# Patient Record
Sex: FEMALE | Race: WHITE
[De-identification: names, ages, dates, MRNs, and addresses within clinical notes are randomized per-mention and may not be internally consistent; named-entity substitution may affect disease eponyms.]

---

## 2020-09-10 ENCOUNTER — HOSPITAL ENCOUNTER (INPATIENT)
Dept: HOSPITAL 96 - M.ERS | Age: 85
LOS: 4 days | Discharge: HOME | DRG: 177 | End: 2020-09-14
Attending: INTERNAL MEDICINE | Admitting: INTERNAL MEDICINE
Payer: MEDICARE

## 2020-09-10 VITALS — BODY MASS INDEX: 28.68 KG/M2 | HEIGHT: 64.02 IN | WEIGHT: 168 LBS

## 2020-09-10 VITALS — SYSTOLIC BLOOD PRESSURE: 166 MMHG | DIASTOLIC BLOOD PRESSURE: 78 MMHG

## 2020-09-10 VITALS — DIASTOLIC BLOOD PRESSURE: 63 MMHG | SYSTOLIC BLOOD PRESSURE: 172 MMHG

## 2020-09-10 VITALS — SYSTOLIC BLOOD PRESSURE: 176 MMHG | DIASTOLIC BLOOD PRESSURE: 66 MMHG

## 2020-09-10 DIAGNOSIS — I50.9: ICD-10-CM

## 2020-09-10 DIAGNOSIS — Z88.5: ICD-10-CM

## 2020-09-10 DIAGNOSIS — J96.02: ICD-10-CM

## 2020-09-10 DIAGNOSIS — M19.90: ICD-10-CM

## 2020-09-10 DIAGNOSIS — G47.00: ICD-10-CM

## 2020-09-10 DIAGNOSIS — J44.0: ICD-10-CM

## 2020-09-10 DIAGNOSIS — J20.9: ICD-10-CM

## 2020-09-10 DIAGNOSIS — R54: ICD-10-CM

## 2020-09-10 DIAGNOSIS — E78.5: ICD-10-CM

## 2020-09-10 DIAGNOSIS — E66.9: ICD-10-CM

## 2020-09-10 DIAGNOSIS — Z90.49: ICD-10-CM

## 2020-09-10 DIAGNOSIS — J96.01: ICD-10-CM

## 2020-09-10 DIAGNOSIS — Z79.899: ICD-10-CM

## 2020-09-10 DIAGNOSIS — J44.1: ICD-10-CM

## 2020-09-10 DIAGNOSIS — Z88.8: ICD-10-CM

## 2020-09-10 DIAGNOSIS — F17.210: ICD-10-CM

## 2020-09-10 DIAGNOSIS — I11.0: ICD-10-CM

## 2020-09-10 DIAGNOSIS — Z20.828: ICD-10-CM

## 2020-09-10 DIAGNOSIS — J15.6: Primary | ICD-10-CM

## 2020-09-10 DIAGNOSIS — Z90.710: ICD-10-CM

## 2020-09-10 DIAGNOSIS — F32.9: ICD-10-CM

## 2020-09-10 DIAGNOSIS — M81.0: ICD-10-CM

## 2020-09-10 DIAGNOSIS — Z96.652: ICD-10-CM

## 2020-09-10 DIAGNOSIS — K21.9: ICD-10-CM

## 2020-09-10 DIAGNOSIS — Z88.6: ICD-10-CM

## 2020-09-10 LAB
ABSOLUTE BASOPHILS: 0 THOU/UL (ref 0–0.2)
ABSOLUTE EOSINOPHILS: 0.1 THOU/UL (ref 0–0.7)
ABSOLUTE MONOCYTES: 0.4 THOU/UL (ref 0–1.2)
ALBUMIN SERPL-MCNC: 3.5 G/DL (ref 3.4–5)
ALP SERPL-CCNC: 49 U/L (ref 46–116)
ALT SERPL-CCNC: 13 U/L (ref 30–65)
ANION GAP SERPL CALC-SCNC: 4 MMOL/L (ref 7–16)
AST SERPL-CCNC: 12 U/L (ref 15–37)
BACTERIA-REFLEX: (no result) /HPF
BASOPHILS NFR BLD AUTO: 0.4 %
BE: 2 MMOL/L
BILIRUB SERPL-MCNC: 0.3 MG/DL
BILIRUB UR-MCNC: NEGATIVE MG/DL
BUN SERPL-MCNC: 13 MG/DL (ref 7–18)
CALCIUM SERPL-MCNC: 8.4 MG/DL (ref 8.5–10.1)
CHLORIDE SERPL-SCNC: 104 MMOL/L (ref 98–107)
CO2 SERPL-SCNC: 34 MMOL/L (ref 21–32)
COLOR UR: YELLOW
CREAT SERPL-MCNC: 1.1 MG/DL (ref 0.6–1.3)
EOSINOPHIL NFR BLD: 1 %
GLUCOSE SERPL-MCNC: 100 MG/DL (ref 70–99)
GRANULOCYTES NFR BLD MANUAL: 63.8 %
HCT VFR BLD CALC: 36.8 % (ref 37–47)
HGB BLD-MCNC: 12.3 GM/DL (ref 12–15)
INR PPP: 1
KETONES UR STRIP-MCNC: NEGATIVE MG/DL
LYMPHOCYTES # BLD: 1.5 THOU/UL (ref 0.8–5.3)
LYMPHOCYTES NFR BLD AUTO: 27.6 %
MAGNESIUM SERPL-MCNC: 2.1 MG/DL (ref 1.8–2.4)
MCH RBC QN AUTO: 31.6 PG (ref 26–34)
MCHC RBC AUTO-ENTMCNC: 33.5 G/DL (ref 28–37)
MCV RBC: 94.3 FL (ref 80–100)
MONOCYTES NFR BLD: 7.2 %
MPV: 7.6 FL. (ref 7.2–11.1)
MUCUS: (no result) STRN/LPF
NEUTROPHILS # BLD: 3.4 THOU/UL (ref 1.6–8.1)
NUCLEATED RBCS: 0 /100WBC
PCO2 BLD: 53 MMHG (ref 35–45)
PLATELET COUNT*: 154 THOU/UL (ref 150–400)
PO2 BLD: 61.9 MMHG (ref 75–100)
POTASSIUM SERPL-SCNC: 4.2 MMOL/L (ref 3.5–5.1)
PROT SERPL-MCNC: 6.1 G/DL (ref 6.4–8.2)
PROT UR QL STRIP: NEGATIVE
PROTHROMBIN TIME: 10.3 SECONDS (ref 9.2–11.5)
RBC # BLD AUTO: 3.9 MIL/UL (ref 4.2–5)
RBC # UR STRIP: NEGATIVE /UL
RBC #/AREA URNS HPF: (no result) /HPF (ref 0–2)
RDW-CV: 14.6 % (ref 10.5–14.5)
SODIUM SERPL-SCNC: 142 MMOL/L (ref 136–145)
SP GR UR STRIP: 1.01 (ref 1–1.03)
SQUAMOUS: (no result) /LPF (ref 0–3)
TRANSITIONAL EPITHEL CELL: (no result) /LPF
URINE CLARITY: CLEAR
URINE GLUCOSE-RANDOM: NEGATIVE
URINE LEUKOCYTES-REFLEX: (no result)
URINE NITRITE-REFLEX: NEGATIVE
URINE WBC-REFLEX: (no result) /HPF (ref 0–5)
UROBILINOGEN UR STRIP-ACNC: 0.2 E.U./DL (ref 0.2–1)
WBC # BLD AUTO: 5.4 THOU/UL (ref 4–11)

## 2020-09-11 VITALS — SYSTOLIC BLOOD PRESSURE: 140 MMHG | DIASTOLIC BLOOD PRESSURE: 83 MMHG

## 2020-09-11 VITALS — SYSTOLIC BLOOD PRESSURE: 161 MMHG | DIASTOLIC BLOOD PRESSURE: 75 MMHG

## 2020-09-11 VITALS — DIASTOLIC BLOOD PRESSURE: 78 MMHG | SYSTOLIC BLOOD PRESSURE: 178 MMHG

## 2020-09-11 VITALS — SYSTOLIC BLOOD PRESSURE: 156 MMHG | DIASTOLIC BLOOD PRESSURE: 61 MMHG

## 2020-09-11 VITALS — DIASTOLIC BLOOD PRESSURE: 71 MMHG | SYSTOLIC BLOOD PRESSURE: 164 MMHG

## 2020-09-11 NOTE — NUR
RECIEVED REPORT AROUND 0730. ASSUMED CARE. VS AND ASSESSMENT AS CHARTED. IV
INTACT LEFT AC. IV ABX GIVEN THIS AM. PT REPORTED PAIN. PAIN MEDICATION GIVEN.
MEDICATION PER MAR. HEART MONITOR ATTACHED AT . IV INFILTRATED. IV TAKEN
OUT. PT LYING IN BED. CALL LIGHT WITHIN REACH. WILL CONTINUE TO MONITOR.

## 2020-09-11 NOTE — NUR
RECEIVED PT PER CART AT APPROX 2220. PT IS AWAKE AND ORIENTED X4. PT IS NOT IN
DISTRESS. PT IS TRACING SB ON THE CARDIAC MONITOR. DENIES CHEST PAIN BUT
STATED SHE HAS "ACHES ALL OVER FROM ARTHRITIS", RELIEVED BY PAIN MEDICINE
GIVEN PER MAR. ADMISSION ASSESSMENT DONE AND CHARTED. PT IS ADVISED ON THE USE
OF CALL LIGHT AND ON THE ROOM SET UP. NO ACUTE CHANGES THROUGHOUT THIS SHIFT.
CALL LIGHT WITHIN REACH. HOURLY ROUNDING DONE FOR PT SAFETY. PT MAINTAINED IN
ISOLATION FOR PENDING COVID PCR RESULT.

## 2020-09-11 NOTE — 2DMMODE
[place] [address]
Phone:  [phone] 2 D/M-MODE ECHOCARDIOGRAM     
_______________________________________________________________________________
 
Name:         CHRIS HOLBROOK           Room:          [address] [place] 
CARTER.#:    [id number]     Account #:     [id number]  
Admission:    09/10/20    Attend Phys:   Britt Bee
Discharge:                Date of Birth: [birth year]  
Date of Service: 20 1719  Report #:      [id number]
        [id number]
_______________________________________________________________________________
THIS REPORT FOR:
 
cc:  FAM - No family physician/PCP 
     FAM - No family physician/PCP 
     Etseban Álvarez MD Located within [facility]        
                                                                       ~
 
--------------- APPROVED REPORT --------------
 
 
Study performed:  2020 15:03:24
 
EXAM: Comprehensive 2D, Doppler, and color-flow 
Echocardiogram 
Patient Location: In-Patient   
Room #:  [facility]     Status:  routine
 
      BSA:         1.80
HR: 74 bpm BP:          161/75 mmHg 
Rhythm: NSR     
 
Other Information 
Study Quality: Adequate
 
Indications
Dyspnea 
 
2D Dimensions
IVSd:  10.22 (7-11mm) LVOT Diam:  18.67 (18-24mm) 
LVDd:  37.01 mm  
PWd:  10.22 (7-11mm) Ascending Ao:  32.27 (22-36mm)
LVDs:  20.87 (25-40mm) 
Aortic Root:  29.84 mm 
 
Volumes
Left Atrial Volume (Systole) 
    LA ESV Index:  23.10 mL/m2
 
Aortic Valve
AoV Peak Tigre.:  1.85 m/s 
AO Peak Gr.:  13.62 mmHg LVOT Max P.16 mmHg
AO Mean Gr.:  7.04 mmHg  LVOT Mean PG:  3.40 mmHg
    LVOT Max V:  1.34 m/s
AO V2 VTI:  36.90 cm  LVOT Mean V:  0.84 m/s
SAM (VTI):  2.18 cm2  LVOT V1 VTI:  29.34 cm
 
 
 
[place] [address]
Phone:  [phone]                     2 D/M-MODE ECHOCARDIOGRAM     
_______________________________________________________________________________
 
Name:         CHRIS HOLBROOK           Room:          [address]#:    [id number]     Account #:     [id number]  
Admission:    09/10/20    Attend Phys:   Britt Bee
Discharge:                Date of Birth: [birth year]  
Date of Service: 20 1719  Report #:      [id number]
        [id number]
_______________________________________________________________________________
Mitral Valve
    E/A Ratio:  1.02
    MV Decel. Time:  213.41 ms
MV E Max Tigre.:  0.82 m/s 
MV PHT:  61.89 ms  
MVA (PHT):  3.55 cm2  
 
TDI
E/Lateral E':  10.25 E/Medial E':  8.20
   Medial E' Tigre.:  0.10 m/s
   Lateral E' Tigre.:  0.08 m/s
 
Pulmonary Valve
PV Peak Tigre.:  0.87 m/s PV Peak Gr.:  3.05 mmHg
 
Tricuspid Valve
    RAP Estimate:  5.00 mmHg
TR Peak Gr.:  25.60 mmHg RVSP:  30.00 mmHg
    PA Pressure:  30.00 mmHg
 
Left Ventricle
The left ventricle is normal size. There is normal LV segmental wall 
motion. There is normal left ventricular wall thickness. Left 
ventricular systolic function is normal. The left ventricular 
ejection fraction is within the normal range. LVEF is 60-65%. The 
left ventricular diastolic function is normal.
 
Right Ventricle
The right ventricle is normal size. The right ventricular systolic 
function is normal.
 
Atria
The left atrium size is normal. The right atrium size is 
normal.
 
Aortic Valve
Mild aortic valve sclerosis. No aortic regurgitation is present. 
There is no aortic valvular stenosis.
 
Mitral Valve
The mitral valve is normal in structure. There is no mitral valve 
regurgitation noted. No evidence of mitral valve stenosis.
 
Tricuspid Valve
The tricuspid valve is normal in structure. Trace tricuspid 
regurgitation.
 
 
[place] [address]
Phone:  [phone]                     2 D/M-MODE ECHOCARDIOGRAM     
_______________________________________________________________________________
 
Name:         CHRIS HOLBROOK           Room:          [address] [place] 
[facility]#:    [id number]     Account #:     [id number]  
Admission:    09/10/20    Attend Phys:   Britt Bee
Discharge:                Date of Birth: [birth year]  
Date of Service: 20 1719  Report #:      [id number]
        [id number]
_______________________________________________________________________________
 
Pulmonic Valve
The pulmonary valve is normal in structure. There is no pulmonic 
valvular regurgitation.
 
Great Vessels
The aortic root is normal in size. IVC is not well 
visualized.
 
Pericardium
There is no pericardial effusion.
 
<Conclusion>
LVEF is 60-65%.
Mild aortic valve sclerosis.
 
 
 
 
 
 
 
 
 
 
 
 
 
 
 
 
 
 
 
 
 
 
 
 
 
 
 
 
 
  <ELECTRONICALLY SIGNED>
                                           By: Esteban Álvarez MD, Located within [facility]      
  20
D: 20   _____________________________________
T: 20   Esteban Álvarez MD, FACC        /INF

## 2020-09-11 NOTE — EKG
[place] [address]
Phone:  [phone]                     ELECTROCARDIOGRAM REPORT      
_______________________________________________________________________________
 
Name:         CHRIS HOLBROOK           Room:          [address]    ADM [place] 
M.R.#:    [id number]     Account #:     [id number]  
Admission:    09/10/20    Attend Phys:   Britt Bee
Discharge:                Date of Birth: [birth year]  
Date of Service: 09/10/20 1747  Report #:      [id number]
        [id number]
_______________________________________________________________________________
THIS REPORT FOR:  //name//                      
 
                         [facility] ED
                                       
Test Date:    2020-09-10               Test Time:    17:47:39
Pat Name:     CHRIS HOLBROOK        Department:   
Patient ID:   [id number]            Room:         [facility]
Gender:       F                        Technician:   MITCHELL
:          1935               Requested By: Marjorie Hopkins
Order Number: [id number] MD:   Esteban Álvarez
                                 Measurements
Intervals                              Axis          
Rate:         70                       P:            49
NH:           162                      QRS:          35
QRSD:         83                       T:            46
QT:           435                                    
QTc:          470                                    
                           Interpretive Statements
Sinus rhythm with pac's
Abnormal R-wave progression, early transition
Baseline wander in lead(s) II,aVF
Compared to ECG 03/15/2015 20:38:06
T-wave abnormality no longer present
Electronically Signed On 2020 11:05:15 CDT by Esteban Álvarez
[email or web address]
 
 
 
 
 
 
 
 
 
 
 
 
 
 
 
 
 
 
 
  <ELECTRONICALLY SIGNED>
                                           By: Esteban Álvarez MD, FAC      
  20     1105
D: 09/10/20 1747   _____________________________________
T: 09/10/20 1747   Esteban Álvarez MD, [facility]        /EPI

## 2020-09-11 NOTE — NUR
CM SPOKE TO THE PT TO DISCUSS HER HOME SITUATION, DISCHARGE PLANNING, AND TO
INFORM OF THE ROLE OF CM. PT A&O, NORMALLY REQUIRES ASSISTANCE X1 WITH ADL'S,
AND TRANSFERS. PT IS W/C BOUND. PT HAS 0 HX OF HH OR SNF. PT CURRENTLY RESIDES
AT [facility] (ASSISTED LIVING).  CM SPOKE TO  WITH
[facility] AND THEY INFORM THAT RAPID COVID TESTING IS NEEDED AT
D/C, AS WELL AS TRANSPOATION BACK TO THE FACILITY, RN TO CALL REPORT AND FAX
D/C ORDERS.  PT HAS REQUESTED THAT NO FAMILY BE CONTACTED AS THEY ARE 'NOT
SPEAKING AND HAVEN'T IN YEARS'. CM WILL REMAIN AVAILABLE TO ASSIST AND FOLLOW
AS NEEDED.
 
[facility]  PHONE: [phone]  FAX: [phone]

## 2020-09-11 NOTE — NUR
PT SPENT TIME IN THE CHAIR THIS SHIFT. BACK IN BED CURRENTLY. BED ALARM ON.
REPORTED PAIN THROUGOUT SHIFT. MEDICATION PER MAR. HOURLY ROUNDING PERFORMED.
IV INTACT. HEART MONITOR ATTACHED AT SR. 2L NC. CALL LIGHT WITHIN REACH. WILL
CONTINUE TO MONITOR.

## 2020-09-12 VITALS — DIASTOLIC BLOOD PRESSURE: 74 MMHG | SYSTOLIC BLOOD PRESSURE: 122 MMHG

## 2020-09-12 VITALS — SYSTOLIC BLOOD PRESSURE: 167 MMHG | DIASTOLIC BLOOD PRESSURE: 71 MMHG

## 2020-09-12 VITALS — DIASTOLIC BLOOD PRESSURE: 74 MMHG | SYSTOLIC BLOOD PRESSURE: 161 MMHG

## 2020-09-12 VITALS — SYSTOLIC BLOOD PRESSURE: 182 MMHG | DIASTOLIC BLOOD PRESSURE: 77 MMHG

## 2020-09-12 VITALS — SYSTOLIC BLOOD PRESSURE: 180 MMHG | DIASTOLIC BLOOD PRESSURE: 77 MMHG

## 2020-09-12 VITALS — SYSTOLIC BLOOD PRESSURE: 147 MMHG | DIASTOLIC BLOOD PRESSURE: 62 MMHG

## 2020-09-12 VITALS — DIASTOLIC BLOOD PRESSURE: 80 MMHG | SYSTOLIC BLOOD PRESSURE: 183 MMHG

## 2020-09-12 VITALS — DIASTOLIC BLOOD PRESSURE: 74 MMHG | SYSTOLIC BLOOD PRESSURE: 154 MMHG

## 2020-09-12 VITALS — DIASTOLIC BLOOD PRESSURE: 81 MMHG | SYSTOLIC BLOOD PRESSURE: 188 MMHG

## 2020-09-12 LAB
ABSOLUTE BASOPHILS: 0 THOU/UL (ref 0–0.2)
ABSOLUTE EOSINOPHILS: 0 THOU/UL (ref 0–0.7)
ABSOLUTE MONOCYTES: 0.4 THOU/UL (ref 0–1.2)
ALBUMIN SERPL-MCNC: 3 G/DL (ref 3.4–5)
ALP SERPL-CCNC: 49 U/L (ref 46–116)
ALT SERPL-CCNC: 13 U/L (ref 30–65)
ANION GAP SERPL CALC-SCNC: 6 MMOL/L (ref 7–16)
AST SERPL-CCNC: 15 U/L (ref 15–37)
BASOPHILS NFR BLD AUTO: 0.7 %
BE: 0.1 MMOL/L
BILIRUB SERPL-MCNC: 0.4 MG/DL
BUN SERPL-MCNC: 11 MG/DL (ref 7–18)
CALCIUM SERPL-MCNC: 8.2 MG/DL (ref 8.5–10.1)
CHLORIDE SERPL-SCNC: 106 MMOL/L (ref 98–107)
CO2 SERPL-SCNC: 32 MMOL/L (ref 21–32)
CREAT SERPL-MCNC: 0.9 MG/DL (ref 0.6–1.3)
EOSINOPHIL NFR BLD: 0.8 %
ESR (SEDRATE): 4 MM/HR (ref 0–30)
GLUCOSE SERPL-MCNC: 110 MG/DL (ref 70–99)
GRANULOCYTES NFR BLD MANUAL: 65.4 %
HCT VFR BLD CALC: 34.4 % (ref 37–47)
HGB BLD-MCNC: 11.6 GM/DL (ref 12–15)
LYMPHOCYTES # BLD: 1.4 THOU/UL (ref 0.8–5.3)
LYMPHOCYTES NFR BLD AUTO: 25.7 %
MAGNESIUM SERPL-MCNC: 1.8 MG/DL (ref 1.8–2.4)
MCH RBC QN AUTO: 31.3 PG (ref 26–34)
MCHC RBC AUTO-ENTMCNC: 33.6 G/DL (ref 28–37)
MCV RBC: 93.3 FL (ref 80–100)
MONOCYTES NFR BLD: 7.4 %
MPV: 7.7 FL. (ref 7.2–11.1)
NEUTROPHILS # BLD: 3.6 THOU/UL (ref 1.6–8.1)
NUCLEATED RBCS: 0 /100WBC
PCO2 BLD: 47.3 MMHG (ref 35–45)
PLATELET COUNT*: 136 THOU/UL (ref 150–400)
PO2 BLD: 114 MMHG (ref 75–100)
POTASSIUM SERPL-SCNC: 3.6 MMOL/L (ref 3.5–5.1)
PROT SERPL-MCNC: 5.8 G/DL (ref 6.4–8.2)
RBC # BLD AUTO: 3.69 MIL/UL (ref 4.2–5)
RDW-CV: 14.4 % (ref 10.5–14.5)
SODIUM SERPL-SCNC: 144 MMOL/L (ref 136–145)
WBC # BLD AUTO: 5.6 THOU/UL (ref 4–11)

## 2020-09-12 NOTE — NUR
ASSUMED PT CARE AT APPROX 1930. PT IS AWAKE AND ORIENTED X4. CARDIAC MONITOR
IS TRACING SR. NO DESATURATIONS NOTED ON 2L OF O2. BUT PT IS NOTED TO DESAT
BETWEEN 88-90 WHENEVER O2 IS NOT ON. PT C/O GENERALIZED CHRONIC JOINT PAIN
THAT IS RELIEVED BY PAIN MEDS GIVEN PER MAR. PT IS ABLE TO REST THROUGOUT THIS
SHIFT. CALL LIGHT WITHIN REACH. HOURLY ROUNDING DONE FOR PT SAFETY. HIGH FALL
PRECAUTIONS IN PLACE.

## 2020-09-12 NOTE — NUR
RECIEVED REPORT AROUND 0715. ASSUMED CARE. VS AND ASSESSMENT AS CHARTED. PT
DROWSY THIS AM. ABLE TO AWAKE FOR MORNING MEDS. MEDS GIVEN PER MAR. PT
UNSTABLE ON FEET WHILE GOING TO BEDSIDE COMMODE. PUT BACK TO BED FOR
BREAKFAST. IV INTACT RIGHT FOREARM. SALINE LOCK. HEART MONITOR ATTACHED AT .
CALL LIGHT WITHIN REACH. WILL CONITNUE TO MONITOR.

## 2020-09-12 NOTE — NUR
PT UP IN CHAIR FOR A WHILE THIS SHIFT. MORE STEADY THROUGHOUT SHIFT. PT
CURRENTLY IN BED. IV INTACT LEFT FOREARM. SALINE LOCK. HEART MONITOR ATTACHED
AT . PT HAD PAIN PILL AT END OF SHIFT. MEDICATION PER MAR. HOURLY ROUNDING
PERFORMED. CALL LIGHT WITHIN REACH. WILL CONTINUE TO MONITOR.

## 2020-09-13 VITALS — DIASTOLIC BLOOD PRESSURE: 64 MMHG | SYSTOLIC BLOOD PRESSURE: 171 MMHG

## 2020-09-13 VITALS — DIASTOLIC BLOOD PRESSURE: 65 MMHG | SYSTOLIC BLOOD PRESSURE: 152 MMHG

## 2020-09-13 VITALS — DIASTOLIC BLOOD PRESSURE: 68 MMHG | SYSTOLIC BLOOD PRESSURE: 136 MMHG

## 2020-09-13 VITALS — DIASTOLIC BLOOD PRESSURE: 79 MMHG | SYSTOLIC BLOOD PRESSURE: 193 MMHG

## 2020-09-13 VITALS — DIASTOLIC BLOOD PRESSURE: 67 MMHG | SYSTOLIC BLOOD PRESSURE: 148 MMHG

## 2020-09-13 VITALS — DIASTOLIC BLOOD PRESSURE: 76 MMHG | SYSTOLIC BLOOD PRESSURE: 180 MMHG

## 2020-09-13 NOTE — NUR
RECIEVED REPORT AROUND 0715. ASSUMED CARE. VS AND ASSESSMENT AS CHARTED. IV
INTACT. RIGHT FOREARM. SALINE LOCK. PT ALERT THIS AM. LYING IN BED. AM MEDS
GIVEN. MEDICATION PER MAR. REPORTS OF PAIN. PAIN MED GIVEN. HEART MONITOR
ATTACHED AT . SET UP FOR BREAKFAST. CALL LIGHT WITHIN REACH. WILL CONTINUE
TO MONITOR.

## 2020-09-13 NOTE — NUR
RECEIVED REPORT AND ASSUMED CARE AT 1900. VSS. CARDIAC MONITORING IN PLACE. PT
DENIES COMPLAINTS OF PAIN, ASSESSMENT COMPLETED AS CHARTED. DISCUSSED PLAN OF
CARE WITH PATIENT. VERBALIZED UNDERSTANDING. PT UP WITH ASSIST TO BSC. BED
LOCKED IN LOWEST POSITION, CALL LIGHT WITHIN REACH, BED ALARM ON.
NO ACUTE CHANGES THROUGH THE SHIFT. HOURLY ROUNDING COMPLETED AND ALL NEEDS
MET.

## 2020-09-13 NOTE — NUR
PT CURRENTLY SITTING UP IN CHAIR.  TO BE ON FOR THE NIGHT. PT ON 0.5L NC
CURRENTLY. SAT AT 95-97. DROPPED BELOW 90 ONCE. WENT BACK UP. PAIN PILL GIVEN
ONCE MORE THIS SHIFT. MEDICATION PER MAR. HOURLY ROUNDING PERFORMED. IV
INTACT. RIGHT FOREARM. HEART MONITOR ATTACHED AT SR. STARTS AMLODIPINE TONIGHT
FOR HIGH BLOOD PRESSURE. CALL LIGHT WITHIN REACH. WILL CONTINUE TO MONITOR.

## 2020-09-14 VITALS — SYSTOLIC BLOOD PRESSURE: 139 MMHG | DIASTOLIC BLOOD PRESSURE: 55 MMHG

## 2020-09-14 VITALS — SYSTOLIC BLOOD PRESSURE: 149 MMHG | DIASTOLIC BLOOD PRESSURE: 59 MMHG

## 2020-09-14 VITALS — SYSTOLIC BLOOD PRESSURE: 139 MMHG | DIASTOLIC BLOOD PRESSURE: 57 MMHG

## 2020-09-14 NOTE — NUR
RECIEVED REPORT AROUND 0715. ASSUMED CARE. PT SLEEPING IN BED THIS AM. IV
INTACT. RIGHT FOREARM. SALINE LOCK. HEART MONITOR ATTACHED AT . PT IS RA.
SAT AT 93% UPON WAKING PT SAT'S UP AND DOWN. MEDS GIVEN THIS AM. MEDICATION
PER MAR. PT REPORTED PAIN. PAIN PILL GIVEN. ASSISTED PT TO BSC. PT LYING IN
BED. PT WAS STABLE THIS AM. SLIGHTLY UNSTEADY AT FIRST. CALL LIGHT WITHIN
REACH. WILL CONTINUE TO MONITOR.

## 2020-09-14 NOTE — NUR
PT DISCHARGE ORDERS RECIEVED. DISCHARGE PACKET GIVEN TO PT. COMMUNICATED
UNDERSTANDING. IV TAKEN OUT. HEART MONITOR TAKEN OFF. ASSISTED WITH CLOTHES.
REPORT GIVEN TO RADHA AT [facility]. ALL BELONGINGS LEFT WITH PT OFF
THE UNIT VIA WHEEL CHAIR WITH [facility].

## 2020-09-14 NOTE — NUR
No acute event this shift. Pt now on Room air Sat at 96%. Pt on ongoing sleep
study. No complains of pain overnight. Enhanced isolation maintain for pending
covd result. Pt safety precaution in placed. Reinforced to use call light for
assistance. Pt been using call light appropriately.

## 2020-09-14 NOTE — NUR
CM INFORMED BY NURSING THAT THE PT IS READY TO D/C TODAY. CM SPOKE TO THE PT
AND SHE IS IN AGREEMENT. CM SPOKE TO NURSING AT [facility] TO INFORM OF THE
PT'S RETURN AND FAXED PT'S D/C ORDERS AND NEGATIVE COVID TESTING. CM ARRANGED
TRANSPORT FOR THE PT FOR 1400 WITH [facility]. CM INFORMED
THE RN IN-CHARGE OF THE PT OF THE PT'S TIME OF TRANSPORT AND WHERE TO CALL
REPORT. RN IN AGREEMENT. CM WILL REMAIN AVAILABLE TO ASSIST AND FOLLOW AS
NEEDED.
 
[facility]  PHONE: [phone]  FAX: [phone]